# Patient Record
Sex: MALE | Race: WHITE | NOT HISPANIC OR LATINO | URBAN - METROPOLITAN AREA
[De-identification: names, ages, dates, MRNs, and addresses within clinical notes are randomized per-mention and may not be internally consistent; named-entity substitution may affect disease eponyms.]

---

## 2017-08-07 ENCOUNTER — TRANSCRIBE ORDERS (OUTPATIENT)
Dept: LAB | Facility: CLINIC | Age: 47
End: 2017-08-07

## 2017-08-07 ENCOUNTER — APPOINTMENT (OUTPATIENT)
Dept: LAB | Facility: CLINIC | Age: 47
End: 2017-08-07
Payer: COMMERCIAL

## 2017-08-07 DIAGNOSIS — Z00.8 HEALTH EXAMINATION IN POPULATION SURVEY: Primary | ICD-10-CM

## 2017-08-07 LAB
CHOLEST SERPL-MCNC: 152 MG/DL (ref 50–200)
EST. AVERAGE GLUCOSE BLD GHB EST-MCNC: 123 MG/DL
HBA1C MFR BLD: 5.9 % (ref 4.2–6.3)
HDLC SERPL-MCNC: 47 MG/DL (ref 40–60)
LDLC SERPL CALC-MCNC: 86 MG/DL (ref 0–100)
TRIGL SERPL-MCNC: 93 MG/DL

## 2017-08-07 PROCEDURE — 80061 LIPID PANEL: CPT

## 2017-08-07 PROCEDURE — 83036 HEMOGLOBIN GLYCOSYLATED A1C: CPT

## 2017-08-07 PROCEDURE — 36415 COLL VENOUS BLD VENIPUNCTURE: CPT

## 2018-02-06 DIAGNOSIS — J01.11 ACUTE RECURRENT FRONTAL SINUSITIS: Primary | ICD-10-CM

## 2018-02-06 RX ORDER — AZITHROMYCIN 250 MG/1
TABLET, FILM COATED ORAL
Qty: 18 TABLET | Refills: 0 | Status: SHIPPED | OUTPATIENT
Start: 2018-02-06 | End: 2018-02-27

## 2018-05-24 DIAGNOSIS — M54.16 RADICULOPATHY, LUMBAR REGION: Primary | ICD-10-CM

## 2018-05-24 DIAGNOSIS — M76.812 ANTERIOR TIBIALIS TENDONITIS OF LEFT LEG: ICD-10-CM

## 2018-05-24 PROBLEM — J01.11 ACUTE RECURRENT FRONTAL SINUSITIS: Status: RESOLVED | Noted: 2018-02-06 | Resolved: 2018-05-24

## 2018-06-01 DIAGNOSIS — M54.16 RADICULOPATHY, LUMBAR REGION: Primary | ICD-10-CM

## 2018-06-01 DIAGNOSIS — M77.11 LATERAL EPICONDYLITIS OF RIGHT ELBOW: Primary | ICD-10-CM

## 2018-06-01 RX ORDER — METHYLPREDNISOLONE 4 MG/1
TABLET ORAL
Qty: 21 TABLET | Refills: 0 | Status: SHIPPED | OUTPATIENT
Start: 2018-06-01 | End: 2019-01-11

## 2018-06-06 ENCOUNTER — EVALUATION (OUTPATIENT)
Dept: PHYSICAL THERAPY | Facility: CLINIC | Age: 48
End: 2018-06-06
Payer: COMMERCIAL

## 2018-06-06 DIAGNOSIS — M54.16 LUMBAR RADICULOPATHY: ICD-10-CM

## 2018-06-06 DIAGNOSIS — M76.812 ANTERIOR TIBIALIS TENDONITIS OF LEFT LEG: ICD-10-CM

## 2018-06-06 DIAGNOSIS — M77.11 LATERAL EPICONDYLITIS OF RIGHT ELBOW: Primary | ICD-10-CM

## 2018-06-06 PROCEDURE — 97140 MANUAL THERAPY 1/> REGIONS: CPT

## 2018-06-06 PROCEDURE — G8990 OTHER PT/OT CURRENT STATUS: HCPCS

## 2018-06-06 PROCEDURE — G8991 OTHER PT/OT GOAL STATUS: HCPCS

## 2018-06-06 PROCEDURE — 97162 PT EVAL MOD COMPLEX 30 MIN: CPT

## 2018-06-06 NOTE — PROGRESS NOTES
PT Evaluation     Today's date: 2018  Patient name: Ernie Brittle  : 1970  MRN: [id number]  Referring provider: Kalee Oviedo MD  Dx:   Encounter Diagnosis     ICD-10-CM    1  Lateral epicondylitis of right elbow M77 11 Ambulatory referral to Physical Therapy   2  Lumbar radiculopathy M54 16    3  Anterior tibialis tendonitis of left leg M76 812                   Assessment  Impairments: abnormal or restricted ROM, activity intolerance and pain with function    Assessment details: Pt is a 41year old RHD male who presents to PT with lumbar radiculopathy, R elbow lateral epicondylitis, and L anterior tibialis tendonitis  He presents with full trunk ROM, adequate strength in BL LE's, no signs of neural tension, with tension in BL hip IR's and quadriceps  Pt also has a knee flexion contracture to both knees, not involving hamstrings  He has localized tenderness to L anterior tibialis proximal origin to mid belly  He has BL gastroc tightness with passive DF to 0-5 degrees  His R elbow is most likely in restorative phase of healing, with mild pain with wrist and digit ext resisted yet no pain with  strength testing  He will benefit from skilled PT to help reduce inflammation in L anterior tibialis, improve flexibility in hip musculature, improve core stability and strength, and further healing of R elbow common extensors and further strengthen to prevent future injury   Thank you kindly for your referral   Understanding of Dx/Px/POC: good   Prognosis: good    Plan  Patient would benefit from: skilled physical therapy  Planned modality interventions: ultrasound and cryotherapy  Planned therapy interventions: abdominal trunk stabilization, joint mobilization, manual therapy, massage, neuromuscular re-education, strengthening, stretching, therapeutic activities, therapeutic exercise and flexibility  Other planned therapy interventions: IASTM  Frequency: 2x week  Duration in visits: 16  Duration in weeks: 8  Treatment plan discussed with: patient  Plan details: Initiate PT as per POC        Subjective Evaluation    History of Present Illness  Mechanism of injury: 3 months ago - anterior tibilias tendoniitis  Tennis, curling, basketball, played sports all his life, L shoe is a bit heavier than R, difficult walking in shoes without L anterior leg pain  Uses L leg for balance during curling  R lateral epicondylitis- occurring for a year  -pain with pushing  Low back pain- pain with walking, burning down L leg, better sitting vs standing    Not a recurrent problem   Quality of life: good    Pain  Current pain ratin (back: pain increases to 10 at worst; elbow 4-5/10)  At best pain rating: 3  At worst pain ratin  Quality: dull ache  Relieving factors: change in position and rest  Aggravating factors: running  Progression: worsening    Social Support  Lives with: spouse    Employment status: working (artist- painting)  Hand dominance: right  Exercise history: curling, basketball      Diagnostic Tests  No diagnostic tests performed  Patient Goals  Patient goals for therapy: return to sport/leisure activities and decreased pain  Patient goal: return to sports, be as painfree as possible        Objective     Active Range of Motion     Lumbar   Flexion: 80 degrees   Extension: 35 degrees   Left lateral flexion: 45 degrees   Right lateral flexion: 50 degrees     Additional Active Range of Motion Details  Mild pain at end range flexion  Pt can touch fingertips to floor  -sciatic nerve tension; - Slump test  Tightness to hip IR's  -hamstring tightness  Flexion contracture to BL knees; R -10 ; L -5  + quadriceps tightness PKB R: 125; L 120  No pain with P-A pressure to lumbar SP's, sacrum  Mild tenderness to L QL, piriformis    Strength/Myotome Testing     Left Wrist/Hand      (2nd hand position)     Trial 1: 110    Trial 2: 100    Right Wrist/Hand      (2nd hand position)     Trial 1: 115    Trial 2: 115    Left Hip   Planes of Motion   Flexion: 5  Extension: 4+  Abduction: 4+  Adduction: 4+  External rotation: 4+  Internal rotation: 4+    Right Hip   Planes of Motion   Flexion: 5  Extension: 4+  Abduction: 4+  Adduction: 4+  External rotation: 4+  Internal rotation: 4+    Left Knee   Flexion: 5  Extension: 5    Right Knee   Flexion: 5  Extension: 5    Left Ankle/Foot   Dorsiflexion: 5  Plantar flexion: 4+  Inversion: 4+  Eversion: 4+  Great toe extension: 4+    Right Ankle/Foot   Dorsiflexion: 5  Plantar flexion: 4+  Inversion: 4+  Eversion: 4+  Great toe extension: 4+    Tests     Right Elbow   Positive Cozen's       Additional Tests Details  + cozen's with elbow flexed, less pain with elbow extended - ECRB more irritated  + pain with MF ext resisted  tenderness to extensor mass, none to lateral epicondyle            Precautions: none    Daily Treatment Diary     Manual              IASTM L anterior tib/ passive stretch             IASTM R elbow common extensors             BL piriformis stretch                                           Exercise Diary              Recumbent bike             TB ankle 4-way             BAPS board                                       Dying bug with DLS             SL clamshells             Bridges with ball squeeze                                       Wrist ext eccentrics             Supine SA punches             SL ER             Prone row             Prone ext                                                                                  Modalities              US to L anterior tib                                         HEP: DF stretch, wall calf stretch, figure-4 stretch, piriformis stretch, quad stretch, common extensors stretch, wrist eccentric curls

## 2018-06-08 ENCOUNTER — OFFICE VISIT (OUTPATIENT)
Dept: PHYSICAL THERAPY | Facility: CLINIC | Age: 48
End: 2018-06-08
Payer: COMMERCIAL

## 2018-06-08 DIAGNOSIS — M54.16 LUMBAR RADICULOPATHY: ICD-10-CM

## 2018-06-08 DIAGNOSIS — M77.11 LATERAL EPICONDYLITIS OF RIGHT ELBOW: Primary | ICD-10-CM

## 2018-06-08 DIAGNOSIS — M76.812 ANTERIOR TIBIALIS TENDONITIS OF LEFT LEG: ICD-10-CM

## 2018-06-08 PROCEDURE — 97140 MANUAL THERAPY 1/> REGIONS: CPT

## 2018-06-08 PROCEDURE — 97035 APP MDLTY 1+ULTRASOUND EA 15: CPT

## 2018-06-08 NOTE — PROGRESS NOTES
Daily Note     Today's date: 2018  Patient name: Rosalina Douglas  : 1970  MRN: [id number]  Referring provider: Bishnu Garcia MD  Dx:   Encounter Diagnosis     ICD-10-CM    1  Lateral epicondylitis of right elbow M77 11    2  Lumbar radiculopathy M54 16    3  Anterior tibialis tendonitis of left leg M76 812                   Subjective: Pt reported no change in symptoms for his lower leg  He played cornhole with heavy sandbags yesterday which seemed to irritate his R elbow  Objective: See treatment diary below      Assessment: Tolerated treatment well  Patient exhibited good technique with therapeutic exercises and would benefit from continued PT  Performed only manuals for today's session  Will add TE's NV  Performed IASTM to both L anterior tib and common extensors at R elbow  Pt had moderate tension in his common extensors in R forearm, reduced some after IASTM and gentle stretching  Pt reported increased soreness to L anterior tib after treatment especially when planting heel on ground, advised pt to ice at home if soreness continues, expected to calm down over next couple days  Plan: Continue per plan of care       Precautions: none    Daily Treatment Diary     Manual              IASTM L anterior tib/ passive stretch 12            IASTM R elbow common extensors 10            BL piriformis stretch 10                          32                Exercise Diary              Recumbent bike NV            TB ankle 4-way             BAPS board                                       Dying bug with DLS             SL clamshells             Bridges with ball squeeze                                       Wrist ext eccentrics             Supine SA punches             SL ER             Prone row             Prone ext                                                                                  Modalities              US to L anterior tib 8                                        HEP: DF stretch, wall calf stretch, figure-4 stretch, piriformis stretch, quad stretch, common extensors stretch, wrist eccentric curls

## 2018-06-12 ENCOUNTER — OFFICE VISIT (OUTPATIENT)
Dept: PHYSICAL THERAPY | Facility: CLINIC | Age: 48
End: 2018-06-12
Payer: COMMERCIAL

## 2018-06-12 DIAGNOSIS — M54.16 LUMBAR RADICULOPATHY: ICD-10-CM

## 2018-06-12 DIAGNOSIS — M76.812 ANTERIOR TIBIALIS TENDONITIS OF LEFT LEG: ICD-10-CM

## 2018-06-12 DIAGNOSIS — M77.11 LATERAL EPICONDYLITIS OF RIGHT ELBOW: Primary | ICD-10-CM

## 2018-06-12 PROCEDURE — 97140 MANUAL THERAPY 1/> REGIONS: CPT

## 2018-06-12 PROCEDURE — 97110 THERAPEUTIC EXERCISES: CPT

## 2018-06-12 PROCEDURE — 97035 APP MDLTY 1+ULTRASOUND EA 15: CPT

## 2018-06-14 ENCOUNTER — APPOINTMENT (OUTPATIENT)
Dept: PHYSICAL THERAPY | Facility: CLINIC | Age: 48
End: 2018-06-14
Payer: COMMERCIAL

## 2018-06-18 ENCOUNTER — APPOINTMENT (OUTPATIENT)
Dept: PHYSICAL THERAPY | Facility: CLINIC | Age: 48
End: 2018-06-18
Payer: COMMERCIAL

## 2018-06-20 ENCOUNTER — OFFICE VISIT (OUTPATIENT)
Dept: PHYSICAL THERAPY | Facility: CLINIC | Age: 48
End: 2018-06-20
Payer: COMMERCIAL

## 2018-06-20 DIAGNOSIS — M76.812 ANTERIOR TIBIALIS TENDONITIS OF LEFT LEG: Primary | ICD-10-CM

## 2018-06-20 DIAGNOSIS — M77.11 LATERAL EPICONDYLITIS OF RIGHT ELBOW: ICD-10-CM

## 2018-06-20 DIAGNOSIS — M54.16 LUMBAR RADICULOPATHY: ICD-10-CM

## 2018-06-20 PROCEDURE — 97035 APP MDLTY 1+ULTRASOUND EA 15: CPT

## 2018-06-20 PROCEDURE — 97140 MANUAL THERAPY 1/> REGIONS: CPT

## 2018-06-20 PROCEDURE — 97110 THERAPEUTIC EXERCISES: CPT

## 2018-06-20 NOTE — PROGRESS NOTES
Daily Note     Today's date: 2018  Patient name: Helen Guzman  : 1970  MRN: [id number]  Referring provider: Meghan Lord MD  Dx:   Encounter Diagnosis     ICD-10-CM    1  Anterior tibialis tendonitis of left leg M76 812    2  Lateral epicondylitis of right elbow M77 11    3  Lumbar radiculopathy M54 16                   Subjective: Pt reports he played tennis a few days ago with no problems with L lower leg  Went curling last night and thinks hes back to square one for his leg  His back feels a little better  His R elbow is really flared up today  Objective: See treatment diary below      Assessment: Tolerated treatment well  Patient exhibited good technique with therapeutic exercises and would benefit from continued PT  Added prone P-A mobs for lumbar region, pt tolerated well  Tension in L anterior tib seems to be lessening  Common extensors in R forearm had increased tension especially along ECRB and supinator which may be caused by heavy gripping  Pt tolerated TE's well, no added pain with exercises  Plan: Continue per plan of care       Daily Treatment Diary     Manual            IASTM L anterior tib/ passive stretch 12 10 10          IASTM R elbow common extensors 10 5 5          BL piriformis stretch 10 10 10          Prone P-A mobs L3-L5   5           32 25 30              Exercise Diary            Recumbent bike NV 5 min L4 5 min L4          TB ankle 4-way             BAPS board  20 cw/ccw L3 20 cw/ccw                                    Dying bug with DLS  Knee to chest 45J each 15x each          SL clamshells             Bridges with ball squeeze  10x10" 10x10"          SLR flex with PPT   3# 15x each                       Wrist ext eccentrics             Supine SA punches  3# 2x10 4# cuff 2x10          SL ER  3# 2x10 4# cuff 2x10          Prone row             Prone ext                                                                   12 15 Modalities  6/8 6/12 6/20          US to L anterior tib 8 8 8                                      HEP: DF stretch, wall calf stretch, figure-4 stretch, piriformis stretch, quad stretch, common extensors stretch, wrist eccentric curls

## 2018-06-26 ENCOUNTER — OFFICE VISIT (OUTPATIENT)
Dept: PHYSICAL THERAPY | Facility: CLINIC | Age: 48
End: 2018-06-26
Payer: COMMERCIAL

## 2018-06-26 DIAGNOSIS — M76.812 ANTERIOR TIBIALIS TENDONITIS OF LEFT LEG: ICD-10-CM

## 2018-06-26 DIAGNOSIS — M54.16 LUMBAR RADICULOPATHY: ICD-10-CM

## 2018-06-26 DIAGNOSIS — M77.11 LATERAL EPICONDYLITIS OF RIGHT ELBOW: Primary | ICD-10-CM

## 2018-06-26 PROCEDURE — 97140 MANUAL THERAPY 1/> REGIONS: CPT

## 2018-06-26 PROCEDURE — 97035 APP MDLTY 1+ULTRASOUND EA 15: CPT

## 2018-06-26 PROCEDURE — 97110 THERAPEUTIC EXERCISES: CPT

## 2018-06-26 NOTE — PROGRESS NOTES
Daily Note     Today's date: 2018  Patient name: Quique David  : 1970  MRN: [id number]  Referring provider: Tabatha Stone MD  Dx:   Encounter Diagnosis     ICD-10-CM    1  Lateral epicondylitis of right elbow M77 11    2  Lumbar radiculopathy M54 16    3  Anterior tibialis tendonitis of left leg M76 812                   Subjective: R elbow is killing me painful to even bend the elbow  Lower leg and back are both feeling better  Played in a long curling tournament over the weekend  Also played 2 hours of tennis after tournament on  and again last night  Has pain with light gripping  Objective: See treatment diary below      Assessment: Tolerated treatment well  Patient exhibited good technique with therapeutic exercises and would benefit from continued PT  Pt's common extensors have increased in tension and tenderness  Pt even has pain with gentle flexion and extension of R elbow  Performed US to common extensors vs  Anterior tib  Had pt perform BAPS board in standing, had a lot more movement from his hips vs  His ankle, able ot correct with VC's  Advised Pt to wear wrist brace at minimum at night to rest wrist extensors  Recommended pt ice R elbow Pt refused said he felt okay  Add more dynamic exercises NV      Plan: Continue per plan of care       Daily Treatment Diary     Manual           IASTM L anterior tib/ passive stretch 12 10 10 10         IASTM R elbow common extensors 10 5 5 10         BL piriformis stretch 10 10 10 5         Prone P-A mobs L3-L5   5           32 25 30 25             Exercise Diary           Recumbent bike NV 5 min L4 5 min L4 5 min L4         TB ankle 4-way             BAPS board  20 cw/ccw L3 20 cw/ccw In standing                                    Dying bug with DLS  Knee to chest 87V each 15x each 15x each         SL clamshells             Bridges with ball squeeze  10x10" 10x10" 10x10"         SLR flex with PPT   3# 15x each 3# 15x each                      Wrist ext eccentrics             Supine SA punches  3# 2x10 4# cuff 2x10 4# cuff 2x10         SL ER  3# 2x10 4# cuff 2x10 4# cuff 2x10         Prone row             Prone ext                                                                   12 15 15             Modalities  6/8 6/12 6/20 6/26         US to L anterior tib 8 8 8 8 to R common extensors                                     HEP: DF stretch, wall calf stretch, figure-4 stretch, piriformis stretch, quad stretch, common extensors stretch, wrist eccentric curls

## 2018-06-28 ENCOUNTER — OFFICE VISIT (OUTPATIENT)
Dept: PHYSICAL THERAPY | Facility: CLINIC | Age: 48
End: 2018-06-28
Payer: COMMERCIAL

## 2018-06-28 DIAGNOSIS — M54.16 LUMBAR RADICULOPATHY: ICD-10-CM

## 2018-06-28 DIAGNOSIS — M76.812 ANTERIOR TIBIALIS TENDONITIS OF LEFT LEG: ICD-10-CM

## 2018-06-28 DIAGNOSIS — M77.11 LATERAL EPICONDYLITIS OF RIGHT ELBOW: Primary | ICD-10-CM

## 2018-06-28 PROCEDURE — 97110 THERAPEUTIC EXERCISES: CPT

## 2018-06-28 PROCEDURE — 97140 MANUAL THERAPY 1/> REGIONS: CPT

## 2018-06-28 NOTE — PROGRESS NOTES
Daily Note     Today's date: 2018  Patient name: Mariah Ramírez  : 1970  MRN: [id number]  Referring provider: Nato Valenzuela MD  Dx:   Encounter Diagnosis     ICD-10-CM    1  Lateral epicondylitis of right elbow M77 11    2  Lumbar radiculopathy M54 16    3  Anterior tibialis tendonitis of left leg M76 812                   Subjective: Low back hurt later that night after last session, every time I twist and turn I get a sharp pain on my right side  Objective: See treatment diary below      Assessment: Tolerated treatment well  Patient exhibited good technique with therapeutic exercises and would benefit from continued PT BAPS board in standing could have caused strain in low back  Performed prone STM to paraspinals on R side along with prone quad stretch, piriformis stretch on R side  Pt seemed to have relief in low back after hands on  Added LTR and supine hip flexion stretch to target paraspinals and advised pt toa dd for home  Plan: Continue per plan of care       Daily Treatment Diary     Manual          IASTM L anterior tib/ passive stretch 12 10 10 10 5        IASTM R elbow common extensors 10 5 5 10 5        BL piriformis stretch 10 10 10 5 5        Prone P-A mobs L3-L5   5  Prone STM 10         32 25 30 25 25            Exercise Diary          Recumbent bike NV 5 min L4 5 min L4 5 min L4 5 min         TB ankle 4-way             BAPS board  20 cw/ccw L3 20 cw/ccw In standing                       Hip flex stretch     5x20"        Dying bug with DLS  Knee to chest 50P each 15x each 15x each         SL clamshells             Bridges with ball squeeze  10x10" 10x10" 10x10"         SLR flex with PPT   3# 15x each 3# 15x each         LTR     10x each        Wrist ext eccentrics             Supine SA punches  3# 2x10 4# cuff 2x10 4# cuff 2x10         SL ER  3# 2x10 4# cuff 2x10 4# cuff 2x10         TB row, ext, hitchiker     BTB 2x10 each Standing scaption     2# cuff 20x                                                              12 15 15 15            Modalities  6/8 6/12 6/20 6/26 6/28        US to L anterior tib 8 8 8 8 to R common extensors                                     HEP: DF stretch, wall calf stretch, figure-4 stretch, piriformis stretch, quad stretch, common extensors stretch, wrist eccentric curls

## 2018-07-17 ENCOUNTER — OFFICE VISIT (OUTPATIENT)
Dept: PHYSICAL THERAPY | Facility: CLINIC | Age: 48
End: 2018-07-17
Payer: COMMERCIAL

## 2018-07-17 DIAGNOSIS — M76.812 ANTERIOR TIBIALIS TENDONITIS OF LEFT LEG: ICD-10-CM

## 2018-07-17 DIAGNOSIS — M54.16 LUMBAR RADICULOPATHY: ICD-10-CM

## 2018-07-17 DIAGNOSIS — M77.11 LATERAL EPICONDYLITIS OF RIGHT ELBOW: Primary | ICD-10-CM

## 2018-07-17 PROCEDURE — G8991 OTHER PT/OT GOAL STATUS: HCPCS

## 2018-07-17 PROCEDURE — G8990 OTHER PT/OT CURRENT STATUS: HCPCS

## 2018-07-17 PROCEDURE — 97140 MANUAL THERAPY 1/> REGIONS: CPT

## 2018-07-17 NOTE — PROGRESS NOTES
PT Evaluation     Today's date: 2018  Patient name: Mervat Ruano  : 1970  MRN: [id number]  Referring provider: Lindsey Francisco MD  Dx:   Encounter Diagnosis     ICD-10-CM    1  Lateral epicondylitis of right elbow M77 11    2  Lumbar radiculopathy M54 16    3  Anterior tibialis tendonitis of left leg M76 812                   Assessment  Impairments: abnormal or restricted ROM, activity intolerance and pain with function    Assessment details: Pt has improved in his symptoms overall since starting therapy  His L anterior tibialis has reduced in tension and tenderness and based on pt's report has been able to tolerate his sporting activities  His low back is still bothersome with occasional burning pain down L lateral thigh  His trunk ROM in unchanged  He has mild irritation with provocation to L SI joint especially when weight is shifted toward L side  His L elbow has recently been exacerbated from playing tennis on vacation,with increased tension to common extensors and pain with wrist ext resisted and gripping  He will benefit from skilled PT to help reduce his pain, improve his trunk and pelvic stability, improve healing of extensor tendons in R forearm, and prevent further injury with activity  Thank you     Understanding of Dx/Px/POC: good   Prognosis: good    Plan  Patient would benefit from: skilled physical therapy  Planned modality interventions: ultrasound and cryotherapy  Planned therapy interventions: abdominal trunk stabilization, joint mobilization, manual therapy, massage, neuromuscular re-education, strengthening, stretching, therapeutic activities, therapeutic exercise and flexibility  Other planned therapy interventions: IASTM  Frequency: 2x week  Duration in visits: 8  Duration in weeks: 4  Treatment plan discussed with: patient  Plan details: Continue PT as per POC        Subjective Evaluation    History of Present Illness  Mechanism of injury: Re-eval: pt reports his elbow pain is still present, more intense after tennis play  L ant tib is gradually improving, able to run and shift weight better on tennis court  Low back is still bothersome, its better than when I started but still gets occasional burning down L anterolateral thigh     Not a recurrent problem   Quality of life: good    Pain  Current pain ratin (back: pain increases to 10 at worst; elbow 4-5/10)  At best pain ratin  At worst pain ratin  Quality: dull ache  Relieving factors: change in position and rest  Aggravating factors: running  Progression: worsening    Social Support  Lives with: spouse    Employment status: working (artist- painting)  Hand dominance: right  Exercise history: curling, basketball, tennis      Diagnostic Tests  No diagnostic tests performed  Patient Goals  Patient goals for therapy: return to sport/leisure activities and decreased pain  Patient goal: return to sports, be as painfree as possible        Objective     Active Range of Motion     Lumbar   Flexion: 80 degrees   Extension: 35 degrees   Left lateral flexion: 45 degrees   Right lateral flexion: 50 degrees     Additional Active Range of Motion Details  Mild pain at end range flexion  Pt can touch fingertips to floor  -sciatic nerve tension; - Slump test  Flexion contracture to BL knees; R -10 ; L -5  No pain with P-A pressure to lumbar SP's, sacrum  + pain with pressure to L PSIS  +FADIR's  - compression/distraction  Pain with R hip hike, pain on L in standing  +Hetal's on L    Strength/Myotome Testing     Left Wrist/Hand      (2nd hand position)     Trial 1: 110    Trial 2: 100    Right Wrist/Hand      (2nd hand position)     Trial 1: 115    Trial 2: 115    Left Hip   Planes of Motion   Flexion: 5  Extension: 4+  Abduction: 4+  Adduction: 4+  External rotation: 4+ (mild pain on L)  Internal rotation: 4+    Right Hip   Planes of Motion   Flexion: 5  Extension: 4+  Abduction: 4+  Adduction: 4+  External rotation: 4+  Internal rotation: 4+    Left Knee   Flexion: 5  Extension: 5    Right Knee   Flexion: 5  Extension: 5    Left Ankle/Foot   Dorsiflexion: 5  Plantar flexion: 4+  Inversion: 4+  Eversion: 4+  Great toe extension: 4+    Right Ankle/Foot   Dorsiflexion: 5  Plantar flexion: 4+  Inversion: 4+  Eversion: 4+  Great toe extension: 4+    Tests     Right Elbow   Positive Cozen's       Additional Tests Details  + cozen's with elbow extended  + pain with MF ext resisted  tenderness to extensor mass, none to lateral epicondyle  No pain with supination/pronation resisted  Increased tension to common extensors              Precautions: none    aily Treatment Diary     Manual  6/8 6/12 6/20 6/26 6/28 7/17       IASTM L anterior tib/ passive stretch 12 10 10 10 5 10       IASTM R elbow common extensors 10 5 5 10 5 10       BL piriformis stretch 10 10 10 5 5 10       Prone P-A mobs L3-L5   5  Prone STM 10 measurements/HEP review 20        32 25 30 25 25 50           Exercise Diary  6/8 6/12 6/20 6/26 6/28 7/17       Recumbent bike NV 5 min L4 5 min L4 5 min L4 5 min         TB ankle 4-way             BAPS board  20 cw/ccw L3 20 cw/ccw In standing                       Hip flex stretch     5x20"        Dying bug with DLS  Knee to chest 66L each 15x each 15x each         SL clamshells             Bridges with ball squeeze  10x10" 10x10" 10x10"         SLR flex with PPT   3# 15x each 3# 15x each         LTR     10x each        Wrist ext eccentrics             Supine SA punches  3# 2x10 4# cuff 2x10 4# cuff 2x10         SL ER  3# 2x10 4# cuff 2x10 4# cuff 2x10         TB row, ext, hitchiker     BTB 2x10 each        Standing scaption     2# cuff 20x                                                              12 15 15 15            Modalities  6/8 6/12 6/20 6/26 6/28 7/17       US to L anterior tib 8 8 8 8 to R common extensors                                     HEP: DF stretch, wall calf stretch, figure-4 stretch, piriformis stretch, quad stretch, common extensors stretch, wrist eccentric curls

## 2018-07-24 ENCOUNTER — OFFICE VISIT (OUTPATIENT)
Dept: PHYSICAL THERAPY | Facility: CLINIC | Age: 48
End: 2018-07-24
Payer: COMMERCIAL

## 2018-07-24 DIAGNOSIS — M76.812 ANTERIOR TIBIALIS TENDONITIS OF LEFT LEG: ICD-10-CM

## 2018-07-24 DIAGNOSIS — M54.16 LUMBAR RADICULOPATHY: ICD-10-CM

## 2018-07-24 DIAGNOSIS — M77.11 LATERAL EPICONDYLITIS OF RIGHT ELBOW: Primary | ICD-10-CM

## 2018-07-24 PROCEDURE — 97035 APP MDLTY 1+ULTRASOUND EA 15: CPT

## 2018-07-24 PROCEDURE — 97140 MANUAL THERAPY 1/> REGIONS: CPT

## 2018-07-24 PROCEDURE — 97110 THERAPEUTIC EXERCISES: CPT

## 2018-07-24 NOTE — PROGRESS NOTES
Daily Note     Today's date: 2018  Patient name: Omid Alonso  : 1970  MRN: [id number]  Referring provider: Trice Barroso MD  Dx:   Encounter Diagnosis     ICD-10-CM    1  Lateral epicondylitis of right elbow M77 11    2  Lumbar radiculopathy M54 16    3  Anterior tibialis tendonitis of left leg M76 812                   Subjective: Pt reported he hardly noticed pain in his lower leg now  He had burning pain down his L low back into thigh from [place] lot in airport  Otherwise his low back felt good during curling competition  His R elbow is giving him the most trouble  He reports he didn't play tennis the whole weekend  Objective: See treatment diary below      Assessment: Tolerated treatment well  Patient exhibited good technique with therapeutic exercises  Pt had increased tension to common extensors, symptoms reduced after manuals, pain went from 8 to 4/10  Held manuals for anterior tibialis  Plan: Continue per plan of care       aily Treatment Diary     Manual        IASTM L anterior tib/ passive stretch 12 10 10 10 5 10       IASTM R elbow common extensors 10 5 5 10 5 10 15      BL piriformis stretch 10 10 10 5 5 10 10      Prone P-A mobs L3-L5   5  Prone STM 10 measurements/HEP review 20        32 25 30 25 25 50 25          Exercise Diary        Recumbent bike NV 5 min L4 5 min L4 5 min L4 5 min   5 min      TB ankle 4-way             BAPS board  20 cw/ccw L3 20 cw/ccw In standing                       Hip flex stretch     5x20"        Dying bug with DLS  Knee to chest 38H each 15x each 15x each   15x each      SL clamshells             Bridges with ball squeeze  10x10" 10x10" 10x10"   10x10"      SLR flex with PPT   3# 15x each 3# 15x each         LTR     10x each        Wrist ext eccentrics             Supine SA punches  3# 2x10 4# cuff 2x10 4# cuff 2x10   Figure-8 4# 20x      SL ER  3# 2x10 4# cuff 2x10 4# cuff 2x10 SL ER 4# 2x10      TB row, ext, hitchiker     BTB 2x10 each  BTB/MTB 2x10 each      Standing scaption     2# cuff 20x  3# cuff 20x                                                            12 15 15 15  17          Modalities  6/8 6/12 6/20 6/26 6/28 7/17 7/24      US to L anterior tib 8 8 8 8 to R common extensors   8 min to common extensors                                  HEP: DF stretch, wall calf stretch, figure-4 stretch, piriformis stretch, quad stretch, common extensors stretch, wrist eccentric curls

## 2018-07-31 ENCOUNTER — OFFICE VISIT (OUTPATIENT)
Dept: PHYSICAL THERAPY | Facility: CLINIC | Age: 48
End: 2018-07-31
Payer: COMMERCIAL

## 2018-07-31 DIAGNOSIS — M54.16 LUMBAR RADICULOPATHY: ICD-10-CM

## 2018-07-31 DIAGNOSIS — M76.812 ANTERIOR TIBIALIS TENDONITIS OF LEFT LEG: ICD-10-CM

## 2018-07-31 DIAGNOSIS — M77.11 LATERAL EPICONDYLITIS OF RIGHT ELBOW: Primary | ICD-10-CM

## 2018-07-31 PROCEDURE — 97110 THERAPEUTIC EXERCISES: CPT

## 2018-07-31 PROCEDURE — 97140 MANUAL THERAPY 1/> REGIONS: CPT

## 2018-07-31 PROCEDURE — 97035 APP MDLTY 1+ULTRASOUND EA 15: CPT

## 2018-07-31 NOTE — PROGRESS NOTES
Daily Note     Today's date: 2018  Patient name: Larissa Kaur  : 1970  MRN: [id number]  Referring provider: Shila Theodore MD  Dx:   Encounter Diagnosis     ICD-10-CM    1  Lateral epicondylitis of right elbow M77 11    2  Lumbar radiculopathy M54 16    3  Anterior tibialis tendonitis of left leg M76 812                   Subjective: Pt reports he is hurting all over  His elbow and his lateral foot are hurting more, his back is no different as before  Objective: See treatment diary below      Assessment: Tolerated treatment well  Patient exhibited good technique with therapeutic exercises and would benefit from continued PT  Pt has tenderness to metatarsal head, and mid shaft of 4th metatarsal, pt standing in foot supination, possible weakness foot everters- provided TB for home to help strengthen  Provided Pt with dorsal wrist blocking splint to help reduce stress on common extensors, advised pt to wear during activities or during sleep  Plan: Continue per plan of care     aily Treatment Diary     Manual       IASTM L anterior tib/ passive stretch 12 10 10 10 5 10       IASTM R elbow common extensors stretch 10 5 5 10 5 10 15 15     BL piriformis stretch 10 10 10 5 5 10 10      Prone P-A mobs L3-L5   5  Prone STM 10 measurements/HEP review 20  10 splint roosevelt      32 25 30 25 25 50 25 15         Exercise Diary       Recumbent bike NV 5 min L4 5 min L4 5 min L4 5 min   5 min 5 min     TB ankle 4-way             BAPS board  20 cw/ccw L3 20 cw/ccw In standing                       Hip flex stretch     5x20"        Dying bug with DLS  Knee to chest 82Y each 15x each 15x each   15x each      SL clamshells             Bridges with ball squeeze  10x10" 10x10" 10x10"   10x10"      SLR flex with PPT   3# 15x each 3# 15x each         LTR     10x each        Wrist ext eccentrics             Supine SA punches  3# 2x10 4# cuff 2x10 4# cuff 2x10   Figure-8 4# 20x 4# 20x     SL ER  3# 2x10 4# cuff 2x10 4# cuff 2x10   SL ER 4# 2x10 SL ER 4# x10     TB row, ext, hitchiker     BTB 2x10 each  BTB/MTB 2x10 each      Standing scaption     2# cuff 20x  3# cuff 20x 3#cuff 20x                                                           12 15 15 15  17 15         Modalities  6/8 6/12 6/20 6/26 6/28 7/17 7/24 7/31     US to L anterior tib 8 8 8 8 to R common extensors   8 min to common extensors 8 min                                 HEP: DF stretch, wall calf stretch, figure-4 stretch, piriformis stretch, quad stretch, common extensors stretch, wrist eccentric curls

## 2018-08-02 ENCOUNTER — OFFICE VISIT (OUTPATIENT)
Dept: PHYSICAL THERAPY | Facility: CLINIC | Age: 48
End: 2018-08-02
Payer: COMMERCIAL

## 2018-08-02 DIAGNOSIS — M54.16 LUMBAR RADICULOPATHY: ICD-10-CM

## 2018-08-02 DIAGNOSIS — M76.812 ANTERIOR TIBIALIS TENDONITIS OF LEFT LEG: ICD-10-CM

## 2018-08-02 DIAGNOSIS — M77.11 LATERAL EPICONDYLITIS OF RIGHT ELBOW: Primary | ICD-10-CM

## 2018-08-02 PROCEDURE — 97140 MANUAL THERAPY 1/> REGIONS: CPT

## 2018-08-02 PROCEDURE — 97110 THERAPEUTIC EXERCISES: CPT

## 2018-08-02 PROCEDURE — 97035 APP MDLTY 1+ULTRASOUND EA 15: CPT

## 2018-08-02 NOTE — PROGRESS NOTES
Daily Note     Today's date: 2018  Patient name: Tammy Henry  : 1970  MRN: [id number]  Referring provider: Maddison Poole MD  Dx:   Encounter Diagnosis     ICD-10-CM    1  Lateral epicondylitis of right elbow M77 11    2  Lumbar radiculopathy M54 16    3  Anterior tibialis tendonitis of left leg M76 812                   Subjective: Pt repots his symptoms are the same in his elbow  Wore his brace over past couple days but no change since wearing it  Objective: See treatment diary below      Assessment: Tolerated treatment well  Patient exhibited good technique with therapeutic exercises  Focused session on R elbow, added more shoulder strengthening exercises  Pt refused CP after session  Plan: Continue per plan of care       aily Treatment Diary     Manual   8/2    IASTM L anterior tib/ passive stretch 12 10 10 10 5 10       IASTM R elbow common extensors stretch 10 5 5 10 5 10 15 15 20    BL piriformis stretch 10 10 10 5 5 10 10      Prone P-A mobs L3-L5   5  Prone STM 10 measurements/HEP review 20  10 splint roosevelt      32 25 30 25 25 50 25 15 20        Exercise Diary   8/2    Recumbent bike NV 5 min L4 5 min L4 5 min L4 5 min   5 min 5 min     TB ankle 4-way             BAPS board  20 cw/ccw L3 20 cw/ccw In standing                       Hip flex stretch     5x20"        Dying bug with DLS  Knee to chest 97A each 15x each 15x each   15x each      SL clamshells             Bridges with ball squeeze  10x10" 10x10" 10x10"   10x10"      SLR flex with PPT   3# 15x each 3# 15x each         LTR     10x each        Wrist ext eccentrics             Supine SA punches  3# 2x10 4# cuff 2x10 4# cuff 2x10   Figure-8 4# 20x 4# 20x 4# 3x10    SL ER  3# 2x10 4# cuff 2x10 4# cuff 2x10   SL ER 4# 2x10 SL ER 4# x10 4# 3x10    TB row, ext, hitchiker     BTB 2x10 each  BTB/MTB 2x10 each  MTB 2x10 each    Standing scaption     2# cuff 20x 3# cuff 20x 3#cuff 20x 4# cuff 2x10    Prone ext, MT "T"         4# cuff 3x10                                             12 15 15 15  17 15 15        Modalities  6/8 6/12 6/20 6/26 6/28 7/17 7/24 7/31 8/2    US to L anterior tib 8 8 8 8 to R common extensors   8 min to common extensors 8 min 8 min                          8      HEP: DF stretch, wall calf stretch, figure-4 stretch, piriformis stretch, quad stretch, common extensors stretch, wrist eccentric curls

## 2018-08-06 DIAGNOSIS — H00.015 HORDEOLUM EXTERNUM OF LEFT LOWER EYELID: Primary | ICD-10-CM

## 2018-08-07 ENCOUNTER — EVALUATION (OUTPATIENT)
Dept: PHYSICAL THERAPY | Facility: CLINIC | Age: 48
End: 2018-08-07
Payer: COMMERCIAL

## 2018-08-07 DIAGNOSIS — M76.812 ANTERIOR TIBIALIS TENDONITIS OF LEFT LEG: ICD-10-CM

## 2018-08-07 DIAGNOSIS — M54.16 LUMBAR RADICULOPATHY: ICD-10-CM

## 2018-08-07 DIAGNOSIS — M77.11 LATERAL EPICONDYLITIS OF RIGHT ELBOW: Primary | ICD-10-CM

## 2018-08-07 PROCEDURE — 97110 THERAPEUTIC EXERCISES: CPT

## 2018-08-07 PROCEDURE — 97035 APP MDLTY 1+ULTRASOUND EA 15: CPT

## 2018-08-07 PROCEDURE — 97140 MANUAL THERAPY 1/> REGIONS: CPT

## 2018-08-07 NOTE — PROGRESS NOTES
Daily Note     Today's date: 2018  Patient name: Dax Gaytan  : 1970  MRN: [id number]  Referring provider: Oli Sales MD  Dx:   Encounter Diagnosis     ICD-10-CM    1  Lateral epicondylitis of right elbow M77 11    2  Lumbar radiculopathy M54 16    3  Anterior tibialis tendonitis of left leg M76 812                   Subjective: Pt reports his elbow is feeling better  Was able to play tennis without significant change  Planning on continuing to rest it  Back is no different, lower leg having no issues  Objective: See treatment diary below      Assessment: Tolerated treatment well  Patient exhibited good technique with therapeutic exercisesPt has decreased tension to common extensors  Performed gentle manuals for low back and METs to correct pelvic alignment  Pt reported improved symptoms in low back and R elbow after session  Possibly 4 more visits  Plan: Continue per plan of care       aily Treatment Diary     Manual     IASTM L anterior tib/ passive stretch 12 10 10 10 5 10       IASTM R elbow common extensors stretch 10 5 5 10 5 10 15 15 20 10   BL piriformis stretch 10 10 10 5 5 10 10   10+ hamstring/pelvic decompression   Prone P-A mobs L3-L5   5  Prone STM 10 measurements/HEP review 20  10 splint roosevelt      32 25 30 25 25 50 25 15 20 20       Exercise Diary     Recumbent bike NV 5 min L4 5 min L4 5 min L4 5 min   5 min 5 min     TB ankle 4-way             BAPS board  20 cw/ccw L3 20 cw/ccw In standing                       Hip flex stretch     5x20"        Dying bug with DLS  Knee to chest 24S each 15x each 15x each   15x each      SL clamshells             Bridges with ball squeeze  10x10" 10x10" 10x10"   10x10"      SLR flex with PPT   3# 15x each 3# 15x each         LTR     10x each        Wrist ext eccentrics             Supine SA punches  3# 2x10 4# cuff 2x10 4# cuff 2x10   Figure-8 4# 20x 4# 20x 4# 3x10 4# 3x10   SL ER  3# 2x10 4# cuff 2x10 4# cuff 2x10   SL ER 4# 2x10 SL ER 4# x10 4# 3x10 4# 3x10   TB row, ext, hitchiker     BTB 2x10 each  BTB/MTB 2x10 each  MTB 2x10 each MTB 2x10 each   Standing scaption     2# cuff 20x  3# cuff 20x 3#cuff 20x 4# cuff 2x10 4# cuff 2x10   Prone ext, MT "T"         4# cuff 3x10 4# 3x10                                            12 15 15 15  17 15 15 15       Modalities  6/8 6/12 6/20 6/26 6/28 7/17 7/24 7/31 8/2 8/7   US to L anterior tib 8 8 8 8 to R common extensors   8 min to common extensors 8 min 8 min 8 min                         8 8     HEP: DF stretch, wall calf stretch, figure-4 stretch, piriformis stretch, quad stretch, common extensors stretch, wrist eccentric curls

## 2018-08-09 ENCOUNTER — OFFICE VISIT (OUTPATIENT)
Dept: PHYSICAL THERAPY | Facility: CLINIC | Age: 48
End: 2018-08-09
Payer: COMMERCIAL

## 2018-08-09 DIAGNOSIS — M77.11 LATERAL EPICONDYLITIS OF RIGHT ELBOW: Primary | ICD-10-CM

## 2018-08-09 DIAGNOSIS — M76.812 ANTERIOR TIBIALIS TENDONITIS OF LEFT LEG: ICD-10-CM

## 2018-08-09 DIAGNOSIS — M54.16 LUMBAR RADICULOPATHY: ICD-10-CM

## 2018-08-09 PROCEDURE — 97035 APP MDLTY 1+ULTRASOUND EA 15: CPT

## 2018-08-09 PROCEDURE — 97110 THERAPEUTIC EXERCISES: CPT

## 2018-08-09 PROCEDURE — 97140 MANUAL THERAPY 1/> REGIONS: CPT

## 2018-08-09 NOTE — PROGRESS NOTES
Daily Note     Today's date: 2018  Patient name: Jesus Bear  : 1970  MRN: [id number]  Referring provider: Becky Ortiz MD  Dx:   Encounter Diagnosis     ICD-10-CM    1  Lateral epicondylitis of right elbow M77 11    2  Lumbar radiculopathy M54 16    3  Anterior tibialis tendonitis of left leg M76 812                   Subjective: Pt reports his elbow is feeling better  Had some anterior tib tendonitis return after playing tennis  Has changed his shoes from tennis shoes to basketball shoes when he plays tennis and he has noticed improvement in his hip pain but his ant tib pain has returned  Objective: See treatment diary below      Assessment: Tolerated treatment well  Patient exhibited good technique with therapeutic exercises Anterior tib had increased tension along medial border  Pt reported he has been doing TB exercises  Applied k-tape to help facilitate eversion and DF of foot  Advised pt to remove if he has any skin irritation or discomfort  According to [employer] he only has approval till Aug 19th, if he would need more we would need another authorization  Pt agreed to one more week then D/C to HEP  Review Pt's HEP NV  Plan: Continue per plan of care     aily Treatment Diary     Manual                           IASTM R elbow common extensors stretch 10            BL piriformis stretch 5            k-tape ant tib 5             20                Exercise Diary              Recumbent bike             TB ankle 4-way HEP                                      Hip flex stretch             Dying bug with DLS             SL clamshells             Bridges with ball squeeze             SLR flex with PPT             LTR             Wrist ext eccentrics HEP            Supine SA punches 4# 3x10            SL ER 4# 3x10            TB row, ext, hitchiker BTB 2x10            Standing scaption 4# 3x10            Prone ext, MT "T" 4# 3x10 each 15                Modalities  8/9            US to L anterior tib 8                          8              HEP: DF stretch, wall calf stretch, figure-4 stretch, piriformis stretch, quad stretch, common extensors stretch, wrist eccentric curls

## 2018-08-14 ENCOUNTER — APPOINTMENT (OUTPATIENT)
Dept: RADIOLOGY | Facility: CLINIC | Age: 48
End: 2018-08-14
Payer: COMMERCIAL

## 2018-08-14 ENCOUNTER — OFFICE VISIT (OUTPATIENT)
Dept: PHYSICAL THERAPY | Facility: CLINIC | Age: 48
End: 2018-08-14
Payer: COMMERCIAL

## 2018-08-14 DIAGNOSIS — M79.672 PAIN IN LEFT FOOT: ICD-10-CM

## 2018-08-14 DIAGNOSIS — M77.11 LATERAL EPICONDYLITIS OF RIGHT ELBOW: Primary | ICD-10-CM

## 2018-08-14 DIAGNOSIS — M79.672 PAIN IN LEFT FOOT: Primary | ICD-10-CM

## 2018-08-14 DIAGNOSIS — M54.16 LUMBAR RADICULOPATHY: ICD-10-CM

## 2018-08-14 DIAGNOSIS — M76.812 ANTERIOR TIBIALIS TENDONITIS OF LEFT LEG: ICD-10-CM

## 2018-08-14 DIAGNOSIS — M84.375A METATARSAL STRESS FRACTURE OF LEFT FOOT, INITIAL ENCOUNTER: Primary | ICD-10-CM

## 2018-08-14 PROCEDURE — 73630 X-RAY EXAM OF FOOT: CPT

## 2018-08-14 PROCEDURE — G8990 OTHER PT/OT CURRENT STATUS: HCPCS

## 2018-08-14 PROCEDURE — 97035 APP MDLTY 1+ULTRASOUND EA 15: CPT

## 2018-08-14 PROCEDURE — G8991 OTHER PT/OT GOAL STATUS: HCPCS

## 2018-08-14 PROCEDURE — 97140 MANUAL THERAPY 1/> REGIONS: CPT

## 2018-08-14 RX ORDER — MELOXICAM 7.5 MG/1
7.5 TABLET ORAL 2 TIMES DAILY
Qty: 60 TABLET | Refills: 2 | Status: SHIPPED | OUTPATIENT
Start: 2018-08-14 | End: 2019-01-11

## 2018-08-14 NOTE — PROGRESS NOTES
PT Re-Evaluation     Today's date: 2018  Patient name: Ellie Hanley  : 1970  MRN: [id number]  Referring provider: Natasha Freedman MD  Dx:   Encounter Diagnosis     ICD-10-CM    1  Lateral epicondylitis of right elbow M77 11    2  Lumbar radiculopathy M54 16    3  Anterior tibialis tendonitis of left leg M76 812                   Assessment  Impairments: abnormal or restricted ROM, activity intolerance and pain with function    Assessment details: Pt's symptoms in his R lateral elbow have improved from recent exacerbation of symptoms a few weeks ago  He has reduced his sports activities to allow the R elbow to rest  However, he still has pain with gripping and pain with wrist and digits extension resisted  Also in the past few weeks he has been experiencing L lateral foot pain and tenderness along his 5th MT shaft  His L anterior tibialis symptoms were improving up until two weeks ago  Now he reports his soreness and pain have returned after playing tennis  His low back pain has improved and his flexibility remains in normal ranges  Recommend another 4 weeks to target his R lateral epicondylitis symptoms and further healing as well as help reduce his L lateral foot pain and anterior tibialis tendonitis  Thank you    Understanding of Dx/Px/POC: good   Prognosis: good    Goals  STG (3-4 weeks)  1: decrease pain 2-3 grades on VAS- not met  2: Decrease tenderness to ant tib/common extensors- partially met  3: Improve  strength by 10-15 lbs- partially met    LTG (6-8 weeks)  1: Improve FOTO 10-15 pts- partially met  2: Pt able to return to sports without pain, restriction- not met  3:  strength within 10% of uninvolved side- not met  4: Pt independent with HEP- met    Plan  Patient would benefit from: skilled physical therapy  Planned modality interventions: ultrasound and cryotherapy  Planned therapy interventions: abdominal trunk stabilization, joint mobilization, manual therapy, massage, neuromuscular re-education, strengthening, stretching, therapeutic activities, therapeutic exercise and flexibility  Other planned therapy interventions: IASTM  Frequency: 2x week  Duration in visits: 8  Duration in weeks: 4  Treatment plan discussed with: patient  Plan details: Continue PT as per POC        Subjective Evaluation    History of Present Illness  Mechanism of injury: Re-eval: Pt reports his R lateral elbow pain has been improving since he started resting it, still has pain with gripping and moving from extension to flexion  Anterior tibialis pain has returned along with lateral foot pain and tenderness, going to get x-ray done today to rule out stress fracture to possibly 5th MT          Not a recurrent problem   Quality of life: good    Pain  Current pain ratin (back: pain increases to 10 at worst; elbow 4-5/10)  At best pain ratin  At worst pain ratin  Location: R lateral elbow, L lateral foot and anterior tibialis  Quality: dull ache  Relieving factors: change in position and rest  Aggravating factors: running  Progression: worsening    Social Support  Lives with: spouse    Employment status: working (artist- painting)  Hand dominance: right  Exercise history: curling, basketball, tennis      Diagnostic Tests  No diagnostic tests performed  Patient Goals  Patient goals for therapy: return to sport/leisure activities and decreased pain  Patient goal: return to sports, be as painfree as possible        Objective     Observations     Additional Observation Details  Pt's foot in standing rests in supination with forefoot inversion, more accentuated on L compared to R  Normal gait pattern, feet are outturned L > R, with navicular collapse       Active Range of Motion     Lumbar   Flexion: 80 degrees   Extension: 35 degrees   Left lateral flexion: 45 degrees   Right lateral flexion: 50 degrees     Additional Active Range of Motion Details  No pain in any directions or combined motions  No pain with p-a pressure to SP's  No symptoms with sciatic nerve tension      Strength/Myotome Testing     Left Wrist/Hand   Wrist extension: 5  Wrist flexion: 5     (2nd hand position)     Trial 1: 100    Right Wrist/Hand   Wrist extension: 4+ (pain increased with elbow flexed)  Wrist flexion: 5     (2nd hand position)     Trial 1: 87 6    Left Hip   Planes of Motion   Flexion: 5  Extension: 4+  Abduction: 4+  Adduction: 4+  External rotation: 4+ (mild pain on L)  Internal rotation: 4+    Right Hip   Planes of Motion   Flexion: 5  Extension: 4+  Abduction: 4+  Adduction: 4+  External rotation: 4+  Internal rotation: 4+    Left Knee   Flexion: 5  Extension: 5    Right Knee   Flexion: 5  Extension: 5    Left Ankle/Foot   Dorsiflexion: 5  Plantar flexion: 4+  Inversion: 4+  Eversion: 4+  Great toe extension: 4+    Right Ankle/Foot   Dorsiflexion: 5  Plantar flexion: 4+  Inversion: 4+  Eversion: 4+  Great toe extension: 4+    Additional Strength Details  Mild pain with L DF resisted    Pain with gripping on R    Tests     Right Elbow   Positive Cozen's       Additional Tests Details  + cozen's increased pain with elbow flexed  + pain with MF ext resisted  tenderness to extensor mass and lateral epicondyle  No pain with supination/pronation resisted  Pain moving from elbow flexion to extension to lateral elbow  palpable tension to common extensors   II R/L 87 6/100 0 lbs                Precautions: none    Daily Treatment Diary     Manual  8/9 8/14             measurements 15           IASTM R elbow common extensors stretch 10 10           BL piriformis stretch 5 5           k-tape ant tib 5 IASTM ant tib 10            20 40               Exercise Diary  8/9 8/14           Recumbent bike  TE held           TB ankle 4-way HEP                                      Hip flex stretch             Dying bug with DLS             SL clamshells             Bridges with ball squeeze             SLR flex with PPT             LTR Wrist ext eccentrics HEP            Supine SA punches 4# 3x10            SL ER 4# 3x10            TB row, ext, hitchiker BTB 2x10            Standing scaption 4# 3x10            Prone ext, MT "T" 4# 3x10 each                                                    15                Modalities  8/9 8/14           US to L anterior tib 8 8                         8 8             HEP: DF stretch, wall calf stretch, figure-4 stretch, piriformis stretch, quad stretch, common extensors stretch, wrist eccentric curls, SA punches, SL ER, prone abd, standing scaption, TB inv/ev L foot,

## 2018-08-16 ENCOUNTER — OFFICE VISIT (OUTPATIENT)
Dept: PHYSICAL THERAPY | Facility: CLINIC | Age: 48
End: 2018-08-16
Payer: COMMERCIAL

## 2018-08-16 DIAGNOSIS — M54.16 LUMBAR RADICULOPATHY: ICD-10-CM

## 2018-08-16 DIAGNOSIS — M77.11 LATERAL EPICONDYLITIS OF RIGHT ELBOW: Primary | ICD-10-CM

## 2018-08-16 DIAGNOSIS — M76.812 ANTERIOR TIBIALIS TENDONITIS OF LEFT LEG: ICD-10-CM

## 2018-08-16 PROCEDURE — 97035 APP MDLTY 1+ULTRASOUND EA 15: CPT

## 2018-08-16 PROCEDURE — 97140 MANUAL THERAPY 1/> REGIONS: CPT

## 2018-08-16 PROCEDURE — 97110 THERAPEUTIC EXERCISES: CPT

## 2018-08-22 ENCOUNTER — APPOINTMENT (OUTPATIENT)
Dept: PHYSICAL THERAPY | Facility: CLINIC | Age: 48
End: 2018-08-22
Payer: COMMERCIAL

## 2018-08-24 ENCOUNTER — APPOINTMENT (OUTPATIENT)
Dept: PHYSICAL THERAPY | Facility: CLINIC | Age: 48
End: 2018-08-24
Payer: COMMERCIAL

## 2018-08-28 ENCOUNTER — OFFICE VISIT (OUTPATIENT)
Dept: PHYSICAL THERAPY | Facility: CLINIC | Age: 48
End: 2018-08-28
Payer: COMMERCIAL

## 2018-08-28 DIAGNOSIS — M77.11 LATERAL EPICONDYLITIS OF RIGHT ELBOW: Primary | ICD-10-CM

## 2018-08-28 DIAGNOSIS — M54.16 LUMBAR RADICULOPATHY: ICD-10-CM

## 2018-08-28 DIAGNOSIS — M76.812 ANTERIOR TIBIALIS TENDONITIS OF LEFT LEG: ICD-10-CM

## 2018-08-28 PROCEDURE — 97110 THERAPEUTIC EXERCISES: CPT

## 2018-08-28 PROCEDURE — 97035 APP MDLTY 1+ULTRASOUND EA 15: CPT

## 2018-08-28 PROCEDURE — 97140 MANUAL THERAPY 1/> REGIONS: CPT

## 2018-08-28 NOTE — PROGRESS NOTES
Daily Note     Today's date: 2018  Patient name: David Muñoz  : 1970  MRN: [id number]  Referring provider: Raji Castro MD  Dx:   Encounter Diagnosis     ICD-10-CM    1  Lateral epicondylitis of right elbow M77 11    2  Lumbar radiculopathy M54 16    3  Anterior tibialis tendonitis of left leg M76 812                   Subjective: R elbow is feeling better  Still has minor pain with gripping and stiffness when extending elbow but noticed an improvement over the past couple weeks  Irritated foot over weekend playing tennis and volleyball  Objective: See treatment diary below      Assessment: Tolerated treatment well  Patient exhibited good technique with therapeutic exercises   II R/L elbow at 90 121/112 lbs  Pt has improved flexibility and  strength in his R arm  Pt had some pain with P-A Mobs at L5 on L, focused mobility on R side, pt was able to tolerate  Advanced pt to CC for scapular exercises       Plan: Continue per plan of care     Daily Treatment Diary     Manual             measurements 15           IASTM R elbow common extensors stretch 10 10 10 10         BL piriformis stretch 5 5 5 P-A mobs lumbar         k-tape ant tib 5 IASTM ant tib 10 10 NP          20 40 25 20             Exercise Diary           Recumbent bike  TE held           TB ankle 4-way HEP                                      Hip flex stretch             Dying bug with DLS             SL clamshells             Bridges with ball squeeze             SLR flex with PPT             LTR             Wrist ext eccentrics HEP            Supine SA punches 4# 3x10  4# 3x10 4# 3x10         SL ER 4# 3x10  4# 3x10 4# 3x10         TB row, ext, hitchiker BTB 2x10  MTB 3x10 CC 65# row, ext         Standing scaption 4# 3x10  4# 3x10 4# 3x10         Prone ext, MT "T" 4# 3x10 each  4# 3x10 4# 3x10                                                 15  10 15             Modalities   US to L anterior tib 8 8 8 8                       8 8 8 8           HEP: DF stretch, wall calf stretch, figure-4 stretch, piriformis stretch, quad stretch, common extensors stretch, wrist eccentric curls, SA punches, SL ER, prone abd, standing scaption, TB inv/ev L foot,

## 2018-08-30 ENCOUNTER — OFFICE VISIT (OUTPATIENT)
Dept: PHYSICAL THERAPY | Facility: CLINIC | Age: 48
End: 2018-08-30
Payer: COMMERCIAL

## 2018-08-30 DIAGNOSIS — M77.11 LATERAL EPICONDYLITIS OF RIGHT ELBOW: Primary | ICD-10-CM

## 2018-08-30 DIAGNOSIS — M76.812 ANTERIOR TIBIALIS TENDONITIS OF LEFT LEG: ICD-10-CM

## 2018-08-30 DIAGNOSIS — M54.16 LUMBAR RADICULOPATHY: ICD-10-CM

## 2018-08-30 PROCEDURE — 97110 THERAPEUTIC EXERCISES: CPT

## 2018-08-30 PROCEDURE — 97035 APP MDLTY 1+ULTRASOUND EA 15: CPT

## 2018-08-30 PROCEDURE — 97140 MANUAL THERAPY 1/> REGIONS: CPT

## 2018-08-30 NOTE — PROGRESS NOTES
Daily Note     Today's date: 2018  Patient name: Leonila Nair  : 1970  MRN: [id number]  Referring provider: Magy Parikh MD  Dx:   Encounter Diagnosis     ICD-10-CM    1  Lateral epicondylitis of right elbow M77 11    2  Lumbar radiculopathy M54 16    3  Anterior tibialis tendonitis of left leg M76 812                   Subjective: Pt states R elbow feeling better; however, feels his most stiffness in the morning and still has some pain with gripping  Pt reports after his tennis and volleyball game his L foot hasn't stopped throbbing  Pt admits not being completely compliance with his HEP  Advised pt to avoid strenuous  activities, be diligent with his HEP, and ice as needed  Objective: See treatment diary below      Assessment: Performed below TE with great tolerance and technique  Noted during IASTM to R elbow increase tissue tightness in the proximal region of the elbow  Upon NV trial again IASTM to ant tib not US if pt still experiences ant tib tenderness  Plan: Continue per plan of care     Daily Treatment Diary     Manual            measurements 15           IASTM R elbow common extensors stretch 10 10 10 10 10        BL piriformis stretch 5 5 5 P-A mobs lumbar 5        k-tape ant tib 5 IASTM ant tib 10 10 NP          20 40 25 20 15            Exercise Diary          Recumbent bike  TE held           TB ankle 4-way HEP                                      Hip flex stretch             Dying bug with DLS             SL clamshells             Bridges with ball squeeze             SLR flex with PPT             LTR             Wrist ext eccentrics HEP            Supine SA punches 4# 3x10  4# 3x10 4# 3x10 4#  3x10        SL ER 4# 3x10  4# 3x10 4# 3x10 4#  3x10        TB row, ext, hitchiker BTB 2x10  MTB 3x10 CC 65# row, ext CC  65#  Row,  Ext         Standing scaption 4# 3x10  4# 3x10 4# 3x10 4#  3x10        Prone ext, MT "T" 4# 3x10 each  4# 3x10 4# 3x10 4#  3x10                                                15  10 15 14            Modalities  8/9 8/14 8/16 8/28 8/30        US to R common extensors/lat epicondyle 8 8 8 8 8             8(ant tib)         8 8 8 8 16          HEP: DF stretch, wall calf stretch, figure-4 stretch, piriformis stretch, quad stretch, common extensors stretch, wrist eccentric curls, SA punches, SL ER, prone abd, standing scaption, TB inv/ev L foot,

## 2018-09-04 ENCOUNTER — OFFICE VISIT (OUTPATIENT)
Dept: PHYSICAL THERAPY | Facility: CLINIC | Age: 48
End: 2018-09-04
Payer: COMMERCIAL

## 2018-09-04 DIAGNOSIS — M76.812 ANTERIOR TIBIALIS TENDONITIS OF LEFT LEG: ICD-10-CM

## 2018-09-04 DIAGNOSIS — M54.16 LUMBAR RADICULOPATHY: ICD-10-CM

## 2018-09-04 DIAGNOSIS — M77.11 LATERAL EPICONDYLITIS OF RIGHT ELBOW: Primary | ICD-10-CM

## 2018-09-04 PROCEDURE — 97140 MANUAL THERAPY 1/> REGIONS: CPT

## 2018-09-04 PROCEDURE — 97035 APP MDLTY 1+ULTRASOUND EA 15: CPT

## 2018-09-04 NOTE — PROGRESS NOTES
Daily Note     Today's date: 2018  Patient name: Omid Alonso  : 1970  MRN: [id number]  Referring provider: Trice Barroso MD  Dx:   Encounter Diagnosis     ICD-10-CM    1  Lateral epicondylitis of right elbow M77 11    2  Lumbar radiculopathy M54 16    3  Anterior tibialis tendonitis of left leg M76 812                   Subjective: Pt reported everything hurts today  His elbow has progressively been feeling better but past two days its been feeling worse  He reports his ant tib has been acting up  Also he has been wearing his CAM boot since yesterday, (didn't wear into clinic)  Objective: See treatment diary below      Assessment: Tolerated treatment well  Patient exhibited good technique with therapeutic exercises and would benefit from continued PT  Ant tib could have gotten irritated with wearing CAM boot  Pt had increased tension in his supinator muscle  Pt tolerated exercises well  Added manual isometrics for R elbow  Plan: Continue per plan of care     aily Treatment Diary     Manual   9/4         measurements 15    Gentle isometrics wrist ext 5       IASTM R elbow common extensors stretch 10 10 10 10 10 IASTm R elbow and L ant tib 10       BL piriformis stretch 5 5 5 P-A mobs lumbar 5 7       k-tape ant tib 5 IASTM ant tib 10 10 NP          20 40 25 20 15 22           Exercise Diary   9/4       Recumbent bike  TE held           TB ankle 4-way HEP                                      Hip flex stretch             Dying bug with DLS             SL clamshells             Bridges with ball squeeze             SLR flex with PPT             LTR             Wrist ext eccentrics HEP            Supine SA punches 4# 3x10  4# 3x10 4# 3x10 4#  3x10 4# 3x10       SL ER 4# 3x10  4# 3x10 4# 3x10 4#  3x10 4# 3x10       TB row, ext, hitchiker BTB 2x10  MTB 3x10 CC 65# row, ext CC  65#  Row,  Ext  CC 65# row, ext       Standing scaption 4# 3x10  4# 3x10 4# 3x10 4#  3x10 4# 3x10       Prone ext, MT "T" 4# 3x10 each  4# 3x10 4# 3x10 4#  3x10 4# 3x10                                               15  10 15 14 15           Modalities  8/9 8/14 8/16 8/28 8/30 9/4       US to R common extensors/lat epicondyle 8 8 8 8 8 8            8(ant tib)         8 8 8 8 16 8         HEP: DF stretch, wall calf stretch, figure-4 stretch, piriformis stretch, quad stretch, common extensors stretch, wrist eccentric curls, SA punches, SL ER, prone abd, standing scaption, TB inv/ev L foot,

## 2018-09-07 ENCOUNTER — APPOINTMENT (OUTPATIENT)
Dept: PHYSICAL THERAPY | Facility: CLINIC | Age: 48
End: 2018-09-07
Payer: COMMERCIAL

## 2018-09-11 ENCOUNTER — OFFICE VISIT (OUTPATIENT)
Dept: PHYSICAL THERAPY | Facility: CLINIC | Age: 48
End: 2018-09-11
Payer: COMMERCIAL

## 2018-09-11 DIAGNOSIS — M76.812 ANTERIOR TIBIALIS TENDONITIS OF LEFT LEG: ICD-10-CM

## 2018-09-11 DIAGNOSIS — M54.16 LUMBAR RADICULOPATHY: ICD-10-CM

## 2018-09-11 DIAGNOSIS — M77.11 LATERAL EPICONDYLITIS OF RIGHT ELBOW: Primary | ICD-10-CM

## 2018-09-11 PROCEDURE — 97110 THERAPEUTIC EXERCISES: CPT

## 2018-09-11 PROCEDURE — 97035 APP MDLTY 1+ULTRASOUND EA 15: CPT

## 2018-09-11 PROCEDURE — 97140 MANUAL THERAPY 1/> REGIONS: CPT

## 2018-09-11 NOTE — PROGRESS NOTES
Daily Note     Today's date: 2018  Patient name: Rich Brock  : 1970  MRN: [id number]  Referring provider: Julieta Cramer MD  Dx:   Encounter Diagnosis     ICD-10-CM    1  Lateral epicondylitis of right elbow M77 11    2  Lumbar radiculopathy M54 16    3  Anterior tibialis tendonitis of left leg M76 812                   Subjective: Pt reports he is only having minor pain with gripping  Had to open a few jars over weekend which increased soreness in R elbow after  L piriformis is feeling sore and tight today  Hasn't been wearing L boot as much, has been causing L knee pain which he would rather avoid  Objective: See treatment diary below      Assessment: Tolerated treatment well  Patient would benefit from continued PT  Pt had increased tension in his R common extensors  Tenderness still present to R lateral epicondyle  Pt only had mild pain with gentle isometrics  Plan: Continue per plan of care     aily Treatment Diary     Manual          measurements 15    Gentle isometrics wrist ext 5 5      IASTM R elbow common extensors stretch 10 10 10 10 10 IASTm R elbow and L ant tib 10 5 R lebow      BL piriformis stretch 5 5 5 P-A mobs lumbar 5 7 L Piriformis TP release R SL      k-tape ant tib 5 IASTM ant tib 10 10 NP          20 40 25 20 15 22 15          Exercise Diary        Recumbent bike  TE held           TB ankle 4-way HEP                                      Hip flex stretch             Dying bug with DLS             SL clamshells             Bridges with ball squeeze             SLR flex with PPT             LTR             Wrist ext eccentrics HEP            Supine SA punches 4# 3x10  4# 3x10 4# 3x10 4#  3x10 4# 3x10 4 # 3x10      SL ER 4# 3x10  4# 3x10 4# 3x10 4#  3x10 4# 3x10 4# 3x10      TB row, ext, hitchiker BTB 2x10  MTB 3x10 CC 65# row, ext CC  65#  Row,  Ext  CC 65# row, ext CC 65# row      Standing scaption 4# 3x10 4# 3x10 4# 3x10 4#  3x10 4# 3x10 4# 3x10      Prone ext, MT "T" 4# 3x10 each  4# 3x10 4# 3x10 4#  3x10 4# 3x10 4# 3x10                                              15  10 15 14 15 15          Modalities  8/9 8/14 8/16 8/28 8/30 9/4 9/11      US to R common extensors/lat epicondyle 8 8 8 8 8 8 8           8(ant tib)         8 8 8 8 16 8 8        HEP: DF stretch, wall calf stretch, figure-4 stretch, piriformis stretch, quad stretch, common extensors stretch, wrist eccentric curls, SA punches, SL ER, prone abd, standing scaption, TB inv/ev L foot,

## 2018-09-13 ENCOUNTER — OFFICE VISIT (OUTPATIENT)
Dept: PHYSICAL THERAPY | Facility: CLINIC | Age: 48
End: 2018-09-13
Payer: COMMERCIAL

## 2018-09-13 DIAGNOSIS — M76.812 ANTERIOR TIBIALIS TENDONITIS OF LEFT LEG: ICD-10-CM

## 2018-09-13 DIAGNOSIS — M77.11 LATERAL EPICONDYLITIS OF RIGHT ELBOW: Primary | ICD-10-CM

## 2018-09-13 DIAGNOSIS — M54.16 LUMBAR RADICULOPATHY: ICD-10-CM

## 2018-09-13 PROCEDURE — 97110 THERAPEUTIC EXERCISES: CPT

## 2018-09-13 PROCEDURE — 97035 APP MDLTY 1+ULTRASOUND EA 15: CPT

## 2018-09-13 PROCEDURE — 97140 MANUAL THERAPY 1/> REGIONS: CPT

## 2018-09-13 NOTE — PROGRESS NOTES
Daily Note     Today's date: 2018  Patient name: Quique David  : 1970  MRN: [id number]  Referring provider: Tabatha Stone MD  Dx:   Encounter Diagnosis     ICD-10-CM    1  Lateral epicondylitis of right elbow M77 11    2  Lumbar radiculopathy M54 16    3  Anterior tibialis tendonitis of left leg M76 812                   Subjective: Pt reports having some pain around his common extensors  States he has been resting his L foot, hasn't been bothering him lately  Objective: See treatment diary below      Assessment: Performed below TE without only any complaint of discomfort or pain  Continue to notice tissue tone common extensor  Performed TPR to L piriformis, post increased comfort  Plan: Continue per plan of care     aily Treatment Diary     Manual         measurements 15    Gentle isometrics wrist ext 5 5      IASTM R elbow common extensors stretch 10 10 10 10 10 IASTm R elbow and L ant tib 10 5 R lebow R elbow     BL piriformis stretch 5 5 5 P-A mobs lumbar 5 7 L Piriformis TP release R SL L Piriformis TP release R SL     k-tape ant tib 5 IASTM ant tib 10 10 NP          20 40 25 20 15 22 15 15         Exercise Diary       Recumbent bike  TE held           TB ankle 4-way HEP                                      Hip flex stretch             Dying bug with DLS             SL clamshells             Bridges with ball squeeze             SLR flex with PPT             LTR             Wrist ext eccentrics HEP            Supine SA punches 4# 3x10  4# 3x10 4# 3x10 4#  3x10 4# 3x10 4 # 3x10 4 # 3x10     SL ER 4# 3x10  4# 3x10 4# 3x10 4#  3x10 4# 3x10 4# 3x10 4# 3x10     TB row, ext, hitchiker BTB 2x10  MTB 3x10 CC 65# row, ext CC  65#  Row,  Ext  CC 65# row, ext CC 65# row CC 65# row,  ext     Standing scaption 4# 3x10  4# 3x10 4# 3x10 4#  3x10 4# 3x10 4# 3x10 4# 3x10     Prone ext, MT "T" 4# 3x10 each  4# 3x10 4# 3x10 4#  3x10 4# 3x10 4# 3x10 4#  3x10                                             15  10 15 14 15 15 10         Modalities  8/9 8/14 8/16 8/28 8/30 9/4 9/11 9/13     US to R common extensors/lat epicondyle 8 8 8 8 8 8 8 8          8(ant tib)         8 8 8 8 16 8 8 8       HEP: DF stretch, wall calf stretch, figure-4 stretch, piriformis stretch, quad stretch, common extensors stretch, wrist eccentric curls, SA punches, SL ER, prone abd, standing scaption, TB inv/ev L foot,

## 2018-09-18 ENCOUNTER — OFFICE VISIT (OUTPATIENT)
Dept: PHYSICAL THERAPY | Facility: CLINIC | Age: 48
End: 2018-09-18
Payer: COMMERCIAL

## 2018-09-18 DIAGNOSIS — M76.812 ANTERIOR TIBIALIS TENDONITIS OF LEFT LEG: ICD-10-CM

## 2018-09-18 DIAGNOSIS — M77.11 LATERAL EPICONDYLITIS OF RIGHT ELBOW: Primary | ICD-10-CM

## 2018-09-18 DIAGNOSIS — M54.16 LUMBAR RADICULOPATHY: ICD-10-CM

## 2018-09-18 PROCEDURE — 97140 MANUAL THERAPY 1/> REGIONS: CPT

## 2018-09-18 PROCEDURE — 97035 APP MDLTY 1+ULTRASOUND EA 15: CPT

## 2018-09-18 PROCEDURE — 97110 THERAPEUTIC EXERCISES: CPT

## 2018-09-18 NOTE — PROGRESS NOTES
Daily Note     Today's date: 2018  Patient name: Manolo Gordon  : 1970  MRN: [id number]  Referring provider: Eleni Lilly MD  Dx:   Encounter Diagnosis     ICD-10-CM    1  Lateral epicondylitis of right elbow M77 11    2  Lumbar radiculopathy M54 16    3  Anterior tibialis tendonitis of left leg M76 812                   Subjective: Pt continue to report having trouble gripping with some pain concentrated in his proximal common wrist extensor  Pt states having good compliance with HEP  Objective: See treatment diary below      Assessment: Performed below TE without any complaint of elbow pain or discomfort  However, noted increased tissue tone proximal common extensor than previous visit  Pt stated possibly due to playing tennis during the weekend but for only 30mins at a time  Plan: Continue per plan of care     aily Treatment Diary     Manual         measurements 15    Gentle isometrics wrist ext 5 5      IASTM R elbow common extensors stretch 10 10 10 10 10 IASTm R elbow and L ant tib 10 5 R lebow R elbow     BL piriformis stretch 5 5 5 P-A mobs lumbar 5 7 L Piriformis TP release R SL L Piriformis TP release R SL     k-tape ant tib 5 IASTM ant tib 10 10 NP          20 40 25 20 15 22 15 14         Exercise Diary       Recumbent bike  TE held           TB ankle 4-way HEP                                      Hip flex stretch             Dying bug with DLS             SL clamshells             Bridges with ball squeeze             SLR flex with PPT             LTR             Wrist ext eccentrics HEP            Supine SA punches 4# 3x10  4# 3x10 4# 3x10 4#  3x10 4# 3x10 4 # 3x10 4 # 3x10     SL ER 4# 3x10  4# 3x10 4# 3x10 4#  3x10 4# 3x10 4# 3x10 4# 3x10     TB row, ext, hitchiker BTB 2x10  MTB 3x10 CC 65# row, ext CC  65#  Row,  Ext  CC 65# row, ext CC 65# row CC 65# row,  ext     Standing scaption 4# 3x10  4# 3x10 4# 3x10 4#  3x10 4# 3x10 4# 3x10 4# 3x10     Prone ext, MT "T" 4# 3x10 each  4# 3x10 4# 3x10 4#  3x10 4# 3x10 4# 3x10 4#  3x10                                             15  10 15 14 15 15 23         Modalities  8/9 8/14 8/16 8/28 8/30 9/4 9/11 9/13 9/18    US to R common extensors/lat epicondyle 8 8 8 8 8 8 8 8 8         8(ant tib)         8 8 8 8 16 8 8 8 8      HEP: DF stretch, wall calf stretch, figure-4 stretch, piriformis stretch, quad stretch, common extensors stretch, wrist eccentric curls, SA punches, SL ER, prone abd, standing scaption, TB inv/ev L foot,

## 2018-09-20 ENCOUNTER — OFFICE VISIT (OUTPATIENT)
Dept: PHYSICAL THERAPY | Facility: CLINIC | Age: 48
End: 2018-09-20
Payer: COMMERCIAL

## 2018-09-20 DIAGNOSIS — M76.812 ANTERIOR TIBIALIS TENDONITIS OF LEFT LEG: ICD-10-CM

## 2018-09-20 DIAGNOSIS — M77.11 LATERAL EPICONDYLITIS OF RIGHT ELBOW: Primary | ICD-10-CM

## 2018-09-20 DIAGNOSIS — M54.16 LUMBAR RADICULOPATHY: ICD-10-CM

## 2018-09-20 PROCEDURE — 97140 MANUAL THERAPY 1/> REGIONS: CPT

## 2018-09-20 PROCEDURE — G8991 OTHER PT/OT GOAL STATUS: HCPCS

## 2018-09-20 PROCEDURE — G8992 OTHER PT/OT  D/C STATUS: HCPCS

## 2018-09-20 NOTE — PROGRESS NOTES
Daily Note/Discharge     Today's date: 2018  Patient name: Quique David  : 1970  MRN: [id number]  Referring provider: Tabatha Stone MD  Dx:   Encounter Diagnosis     ICD-10-CM    1  Lateral epicondylitis of right elbow M77 11    2  Lumbar radiculopathy M54 16    3  Anterior tibialis tendonitis of left leg M76 812                   Subjective: Pt reports his L foot is feeling better, no pain in anterior tibialis  His low back is feeling better with home exercises  He still gets pain in R lateral elbow but intensity is much less than before  Has been resting it as much as possible  Objective: See treatment diary below      Assessment: Tolerated treatment well  Patient exhibited good technique with therapeutic exercises   Reviewed HEP with Pt   Pt is ready for D/C to HEP  No pain with Mill's stretch   133 (was 87 6 before)/118 6  Wrist ext 5/5 with minor pain  Minimal pain with MF ext resisted  STG (3-4 weeks)  1: decrease pain 2-3 grades on VAS- partially met - pain still 7/10 at worst for elbow  2: Decrease tenderness to ant tib/common extensors- partially met  3: Improve  strength by 10-15 lbs- met    LTG (6-8 weeks)  1: Improve FOTO 10-15 pts- met  2: Pt able to return to sports without pain, restriction- met  3:  strength within 10% of uninvolved side- met  4: Pt independent with HEP- met        Plan: D/C to HEP  Daily Treatment Diary     Manual       measurements 15    Gentle isometrics wrist ext 5 5      IASTM R elbow common extensors stretch 10 10 10 10 10 IASTm R elbow and L ant tib 10 5 R lebow R elbow R elbow R elbow 10   BL piriformis stretch 5 5 5 P-A mobs lumbar 5 7 L Piriformis TP release R SL L Piriformis TP release R SL L Piriformis/TP release 10   k-tape ant tib 5 IASTM ant tib 10 10 NP      measurements 5    20 40 25 20 15 22 15 14  25       Exercise Diary     Recumbent bike TE held           TB ankle 4-way HEP                                      Hip flex stretch             Dying bug with DLS             SL clamshells             Bridges with ball squeeze             SLR flex with PPT             LTR             Wrist ext eccentrics HEP            Supine SA punches 4# 3x10  4# 3x10 4# 3x10 4#  3x10 4# 3x10 4 # 3x10 4 # 3x10 4# 3x10 NP   SL ER 4# 3x10  4# 3x10 4# 3x10 4#  3x10 4# 3x10 4# 3x10 4# 3x10 4# 3x10    TB row, ext, hitchiker BTB 2x10  MTB 3x10 CC 65# row, ext CC  65#  Row,  Ext  CC 65# row, ext CC 65# row CC 65# row,  ext CC 65# row, ext    Standing scaption 4# 3x10  4# 3x10 4# 3x10 4#  3x10 4# 3x10 4# 3x10 4# 3x10 4# 3x10    Prone ext, MT "T" 4# 3x10 each  4# 3x10 4# 3x10 4#  3x10 4# 3x10 4# 3x10 4#  3x10 4# 3x10                                            15  10 15 14 15 15 23         Modalities  8/9 8/14 8/16 8/28 8/30 9/4 9/11 9/13 9/18 9/20   US to R common extensors/lat epicondyle 8 8 8 8 8 8 8 8 8 NP        8(ant tib)         8 8 8 8 16 8 8 8 8      HEP: DF stretch, wall calf stretch, figure-4 stretch, piriformis stretch, quad stretch, common extensors stretch, wrist eccentric curls, SA punches, SL ER, prone abd, standing scaption, TB inv/ev L foot,

## 2019-01-11 ENCOUNTER — APPOINTMENT (OUTPATIENT)
Dept: RADIOLOGY | Facility: CLINIC | Age: 49
End: 2019-01-11
Payer: COMMERCIAL

## 2019-01-11 VITALS
WEIGHT: 291 LBS | DIASTOLIC BLOOD PRESSURE: 60 MMHG | HEIGHT: 77 IN | SYSTOLIC BLOOD PRESSURE: 120 MMHG | BODY MASS INDEX: 34.36 KG/M2

## 2019-01-11 DIAGNOSIS — M76.51 PATELLAR TENDONITIS OF RIGHT KNEE: Primary | ICD-10-CM

## 2019-01-11 DIAGNOSIS — M25.561 RIGHT KNEE PAIN, UNSPECIFIED CHRONICITY: ICD-10-CM

## 2019-01-11 PROCEDURE — 73564 X-RAY EXAM KNEE 4 OR MORE: CPT

## 2019-01-11 PROCEDURE — 99213 OFFICE O/P EST LOW 20 MIN: CPT | Performed by: ORTHOPAEDIC SURGERY

## 2019-01-11 RX ORDER — ASPIRIN 325 MG
325 TABLET ORAL DAILY
COMMUNITY

## 2019-01-11 RX ORDER — DIPHENOXYLATE HYDROCHLORIDE AND ATROPINE SULFATE 2.5; .025 MG/1; MG/1
1 TABLET ORAL DAILY
COMMUNITY

## 2019-01-11 NOTE — PROGRESS NOTES
Assessment:     1  Patellar tendonitis of right knee    2  Right knee pain, unspecified chronicity        Plan:     Problem List Items Addressed This Visit        Musculoskeletal and Integument    Patellar tendonitis of right knee - Primary     Findings consistent with right patella insertional tendinitis  Discussed findings and treatment options with the patient  I reviewed patient's right knee x-ray with him  I discussed prognosis of his injury  I advised patient to avoid high impact, squatting, launching activities as much as possible  Patient will be participating in a curling tournament in 2 weeks  I will provide patient a prescription for obtaining a postop hinged knee brace lock in full extension while he ambulates  He may remove the brace when not walking  I also provided patient a patellar tendon strap to use in about 2 weeks  I advised patient to warm up his knee and stretched the extensor mechanism before his competition  I advised patient if his pain increases, he should stop competing  Patient should take over-the-counter NSAID to decrease inflammation  I will see patient back in 4-6 weeks if his symptoms persist   All patient's questions were answered to his satisfaction  This note is created using dictation transcription  It may contain typographical errors, grammatical errors, improperly dictated words, background noise and other errors  Relevant Orders    Patellar strap    T-Rom  Post Op Knee Brace      Other Visit Diagnoses     Right knee pain, unspecified chronicity        Relevant Orders    XR knee 4+ vw right injury         Subjective:     Patient ID: Jyoti Higginbotham is a 57 y o  male  Chief Complaint:  61-year-old male with new onset of pain in his right knee while curling few days ago  Patient stated the pain started suddenly while he was pushing with knee in a squat position  His pain is localized over the front of the knee    He had difficulty bending his knee with weight-bearing  Pain has improved the past couple days  He is walking without use of any assistive device  He denies locking or giving way sensations  He denies injury in the past  Information on patient's intake form was reviewed  Allergy:  No Known Allergies  Medications:  all current active meds have been reviewed  Past Medical History:  Past Medical History:   Diagnosis Date    Heart murmur     A-fib, no occurence in 5 years    Right rotator cuff tear     Ulnar collateral ligament sprain of right elbow, sequela      Past Surgical History:  Past Surgical History:   Procedure Laterality Date    EYE MUSCLE SURGERY      SHOULDER ARTHROSCOPY W/ LABRAL REPAIR      SHOULDER ARTHROSCOPY W/ ROTATOR CUFF REPAIR      SINUS SURGERY       Family History:  History reviewed  No pertinent family history  Social History:  History   Alcohol use Not on file     History   Drug use: Unknown     History   Smoking Status    Never Smoker   Smokeless Tobacco    Never Used     Review of Systems   Constitutional: Negative  HENT: Negative  Eyes: Negative  Respiratory: Negative  Cardiovascular: Negative  Gastrointestinal: Negative  Endocrine: Negative  Genitourinary: Negative  Musculoskeletal: Positive for arthralgias (Right knee)  Negative for gait problem and joint swelling  Skin: Negative  Neurological: Negative  Hematological: Negative  Psychiatric/Behavioral: Negative  Objective:  BP Readings from Last 1 Encounters:   01/11/19 120/60      Wt Readings from Last 1 Encounters:   01/11/19 132 kg (291 lb)      BMI:   Estimated body mass index is 34 51 kg/m² as calculated from the following:    Height as of this encounter: 6' 5" (1 956 m)  Weight as of this encounter: 132 kg (291 lb)  BSA:   Estimated body surface area is 2 62 meters squared as calculated from the following:    Height as of this encounter: 6' 5" (1 956 m)  Weight as of this encounter: 132 kg (291 lb)  Physical Exam   Constitutional: He is oriented to person, place, and time  He appears well-developed  HENT:   Head: Normocephalic and atraumatic  Right Ear: External ear normal    Left Ear: External ear normal    Nose: Nose normal    Eyes: Conjunctivae and EOM are normal    Neck: Neck supple  Musculoskeletal:        Right knee: He exhibits no effusion  Neurological: He is alert and oriented to person, place, and time  Skin: Skin is warm  Psychiatric: He has a normal mood and affect  Nursing note and vitals reviewed  Right Knee Exam     Tenderness   The patient is experiencing tenderness in the patellar tendon and patella (At the inferior pole of the patella)  Range of Motion   The patient has normal right knee ROM  Muscle Strength     The patient has normal right knee strength  Tests   Adrián:  Medial - negative Lateral - negative  Varus: negative  Valgus: negative  Patellar Apprehension: negative    Other   Erythema: absent  Sensation: normal  Pulse: present  Swelling: none  Other tests: no effusion present    Comments: There is no defect with palpation over the patellar tendon  I have personally reviewed pertinent films in PACS and my interpretation is Right knee show good joint alignment  Mild to moderate osteoarthritis changes with marginal osteophyte  Most severe in the patellofemoral joint  No soft tissue calcification

## 2019-01-11 NOTE — ASSESSMENT & PLAN NOTE
Findings consistent with right patella insertional tendinitis  Discussed findings and treatment options with the patient  I reviewed patient's right knee x-ray with him  I discussed prognosis of his injury  I advised patient to avoid high impact, squatting, launching activities as much as possible  Patient will be participating in a curling tournament in 2 weeks  I will provide patient a prescription for obtaining a postop hinged knee brace lock in full extension while he ambulates  He may remove the brace when not walking  I also provided patient a patellar tendon strap to use in about 2 weeks  I advised patient to warm up his knee and stretched the extensor mechanism before his competition  I advised patient if his pain increases, he should stop competing  Patient should take over-the-counter NSAID to decrease inflammation  I will see patient back in 4-6 weeks if his symptoms persist   All patient's questions were answered to his satisfaction  This note is created using dictation transcription  It may contain typographical errors, grammatical errors, improperly dictated words, background noise and other errors

## 2019-06-18 DIAGNOSIS — M54.16 RADICULOPATHY, LUMBAR REGION: Primary | ICD-10-CM

## 2019-07-03 DIAGNOSIS — J01.00 ACUTE MAXILLARY SINUSITIS, RECURRENCE NOT SPECIFIED: Primary | ICD-10-CM

## 2019-07-03 RX ORDER — AZITHROMYCIN 250 MG/1
TABLET, FILM COATED ORAL
Qty: 6 TABLET | Refills: 0 | Status: SHIPPED | OUTPATIENT
Start: 2019-07-03 | End: 2019-07-07

## 2019-11-11 DIAGNOSIS — J01.01 ACUTE RECURRENT MAXILLARY SINUSITIS: Primary | ICD-10-CM

## 2019-11-11 RX ORDER — AZITHROMYCIN 250 MG/1
TABLET, FILM COATED ORAL
Qty: 6 TABLET | Refills: 0 | Status: SHIPPED | OUTPATIENT
Start: 2019-11-11 | End: 2019-11-15

## 2020-08-20 ENCOUNTER — LAB REQUISITION (OUTPATIENT)
Dept: LAB | Facility: HOSPITAL | Age: 50
End: 2020-08-20
Attending: HOSPITALIST
Payer: COMMERCIAL

## 2020-08-20 DIAGNOSIS — Z11.59 ENCOUNTER FOR SCREENING FOR OTHER VIRAL DISEASES: ICD-10-CM

## 2020-08-20 PROCEDURE — U0002 COVID-19 LAB TEST NON-CDC: HCPCS | Performed by: HOSPITALIST

## 2020-08-21 LAB — SARS-COV-2 RNA RESP QL NAA+PROBE: NOT DETECTED

## 2020-08-21 NOTE — RESULT ENCOUNTER NOTE
The lab is coordinating relaying results for contracted organizations, they will perform notification.

## 2021-02-03 ENCOUNTER — LAB REQUISITION (OUTPATIENT)
Dept: LAB | Facility: HOSPITAL | Age: 51
End: 2021-02-03
Attending: HOSPITALIST
Payer: COMMERCIAL

## 2021-02-03 DIAGNOSIS — Z11.59 ENCOUNTER FOR SCREENING FOR OTHER VIRAL DISEASES: ICD-10-CM

## 2021-02-03 PROCEDURE — C9803 HOPD COVID-19 SPEC COLLECT: HCPCS | Performed by: HOSPITALIST

## 2021-02-03 PROCEDURE — U0003 INFECTIOUS AGENT DETECTION BY NUCLEIC ACID (DNA OR RNA); SEVERE ACUTE RESPIRATORY SYNDROME CORONAVIRUS 2 (SARS-COV-2) (CORONAVIRUS DISEASE [COVID-19]), AMPLIFIED PROBE TECHNIQUE, MAKING USE OF HIGH THROUGHPUT TECHNOLOGIES AS DESCRIBED BY CMS-2020-01-R: HCPCS | Performed by: HOSPITALIST

## 2021-02-05 LAB — SARS-COV-2 RNA RESP QL NAA+PROBE: NOT DETECTED

## 2021-03-31 DIAGNOSIS — Z23 ENCOUNTER FOR IMMUNIZATION: ICD-10-CM
